# Patient Record
Sex: MALE | Race: OTHER | Employment: FULL TIME | URBAN - METROPOLITAN AREA
[De-identification: names, ages, dates, MRNs, and addresses within clinical notes are randomized per-mention and may not be internally consistent; named-entity substitution may affect disease eponyms.]

---

## 2018-08-26 ENCOUNTER — HOSPITAL ENCOUNTER (OUTPATIENT)
Age: 33
Discharge: HOME | End: 2018-08-26
Attending: FAMILY MEDICINE
Payer: COMMERCIAL

## 2018-08-26 ENCOUNTER — APPOINTMENT (OUTPATIENT)
Dept: CT IMAGING | Age: 33
End: 2018-08-26
Attending: FAMILY MEDICINE
Payer: COMMERCIAL

## 2018-08-26 VITALS
OXYGEN SATURATION: 97 % | SYSTOLIC BLOOD PRESSURE: 126 MMHG | HEART RATE: 74 BPM | DIASTOLIC BLOOD PRESSURE: 81 MMHG | TEMPERATURE: 99 F | RESPIRATION RATE: 16 BRPM

## 2018-08-26 DIAGNOSIS — R31.9 HEMATURIA, UNSPECIFIED TYPE: Primary | ICD-10-CM

## 2018-08-26 DIAGNOSIS — R35.0 URINARY FREQUENCY: ICD-10-CM

## 2018-08-26 LAB
#LYMPHOCYTE IC: 3.3 X10ˆ3/UL (ref 0.9–3.2)
#MXD IC: 0.6 X10ˆ3/UL (ref 0.1–1)
#NEUTROPHIL IC: 4.8 X10ˆ3/UL (ref 1.3–6.7)
CREAT SERPL-MCNC: 1.1 MG/DL (ref 0.7–1.3)
GLUCOSE BLD-MCNC: 100 MG/DL (ref 70–99)
HCT IC: 45 % (ref 37–54)
HGB IC: 15.2 G/DL (ref 13–17)
ISTAT BUN: 19 MG/DL (ref 8–20)
ISTAT CHLORIDE: 103 MMOL/L (ref 101–111)
ISTAT HEMATOCRIT: 45 % (ref 37–53)
ISTAT IONIZED CALCIUM: 1.14 MMOL/L
ISTAT POTASSIUM: 3.6 MMOL/L (ref 3.6–5.1)
ISTAT SODIUM: 141 MMOL/L (ref 136–144)
LYMPHOCYTES NFR BLD AUTO: 38.1 %
MCH IC: 29.6 PG (ref 27–33.2)
MCHC IC: 33.8 G/DL (ref 31–37)
MCV IC: 87.7 FL (ref 80–99)
MIXED CELL %: 6.5 %
NEUTROPHILS NFR BLD AUTO: 55.4 %
PLT IC: 197 X10ˆ3/UL (ref 150–450)
POCT BILIRUBIN URINE: NEGATIVE
POCT BLOOD URINE: NEGATIVE
POCT GLUCOSE URINE: NEGATIVE MG/DL
POCT KETONE URINE: NEGATIVE MG/DL
POCT LEUKOCYTE ESTERASE URINE: NEGATIVE
POCT NITRITE URINE: NEGATIVE
POCT PH URINE: 7 (ref 5–8)
POCT PROTEIN URINE: NEGATIVE MG/DL
POCT SPECIFIC GRAVITY URINE: 1.02
POCT URINE CLARITY: CLEAR
POCT UROBILINOGEN URINE: 0.2 MG/DL
RBC IC: 5.13 X10ˆ6/UL (ref 4.3–5.7)
WBC IC: 8.7 X10ˆ3/UL (ref 4–13)

## 2018-08-26 PROCEDURE — 74176 CT ABD & PELVIS W/O CONTRAST: CPT | Performed by: FAMILY MEDICINE

## 2018-08-26 PROCEDURE — 87086 URINE CULTURE/COLONY COUNT: CPT | Performed by: FAMILY MEDICINE

## 2018-08-26 PROCEDURE — 99214 OFFICE O/P EST MOD 30 MIN: CPT

## 2018-08-26 PROCEDURE — 96360 HYDRATION IV INFUSION INIT: CPT

## 2018-08-26 PROCEDURE — 80047 BASIC METABLC PNL IONIZED CA: CPT

## 2018-08-26 PROCEDURE — 81002 URINALYSIS NONAUTO W/O SCOPE: CPT | Performed by: FAMILY MEDICINE

## 2018-08-26 PROCEDURE — 85025 COMPLETE CBC W/AUTO DIFF WBC: CPT | Performed by: FAMILY MEDICINE

## 2018-08-26 RX ORDER — SODIUM CHLORIDE 9 MG/ML
1000 INJECTION, SOLUTION INTRAVENOUS ONCE
Status: COMPLETED | OUTPATIENT
Start: 2018-08-26 | End: 2018-08-26

## 2018-08-26 RX ORDER — SULFAMETHOXAZOLE AND TRIMETHOPRIM 800; 160 MG/1; MG/1
1 TABLET ORAL 2 TIMES DAILY
Qty: 14 TABLET | Refills: 0 | Status: SHIPPED | OUTPATIENT
Start: 2018-08-26

## 2018-08-26 NOTE — ED INITIAL ASSESSMENT (HPI)
Patient presents with cc of blood in his urine and urgency last week. No fever noted. Denies nausea vomiting or diarrhea. Also with skin redness to abdomen(states from sunburn which he popped.

## 2018-08-26 NOTE — ED PROVIDER NOTES
Patient Seen in: THE [facility] [place]    History   Patient presents with:  Urinary Symptoms (urologic)    Stated Complaint: blood in urine    HPI    This 71-year-old male presents to the office with complaint of dysuria and urinary freque ausculation. No retractions or tachypnea noted. ABDOMEN: Soft, nontender, no guarding, rigidity or rebound, no masses or hepatosplenomegaly, normal bowel sounds in all four quadrants. BACK: No CVA tenderness is noted. EXTREMITIES: No edema noted.   SKIN: acute or active abnormality otherwise identified. CONCLUSION:  No kidney stone, or hydronephrosis.     Dictated by: Soham Quintanilla MD on 8/26/2018 at 14:06     Approved by: Soham Quintanilla MD            ED Course as of Aug 26 3662  ------------------- least 64 ounces of water daily. Help acidify the urine by drinking 1 glass of lemonade, cranberry juice or 1 tablet a cranberry daily. Limit your caffeine to one serving daily.   Go to the emergency room if you develop fever, chills, persistent vomiting,